# Patient Record
Sex: FEMALE | Race: WHITE | Employment: FULL TIME | URBAN - METROPOLITAN AREA
[De-identification: names, ages, dates, MRNs, and addresses within clinical notes are randomized per-mention and may not be internally consistent; named-entity substitution may affect disease eponyms.]

---

## 2018-05-24 ENCOUNTER — OFFICE VISIT (OUTPATIENT)
Dept: INTERNAL MEDICINE CLINIC | Age: 63
End: 2018-05-24

## 2018-05-24 VITALS
HEIGHT: 69 IN | HEART RATE: 77 BPM | OXYGEN SATURATION: 97 % | DIASTOLIC BLOOD PRESSURE: 80 MMHG | SYSTOLIC BLOOD PRESSURE: 115 MMHG | WEIGHT: 210 LBS | TEMPERATURE: 98.2 F | RESPIRATION RATE: 18 BRPM | BODY MASS INDEX: 31.1 KG/M2

## 2018-05-24 DIAGNOSIS — Z12.11 SCREENING FOR COLON CANCER: ICD-10-CM

## 2018-05-24 DIAGNOSIS — Z00.00 ROUTINE PHYSICAL EXAMINATION: Primary | ICD-10-CM

## 2018-05-24 DIAGNOSIS — R31.21 ASYMPTOMATIC MICROSCOPIC HEMATURIA: ICD-10-CM

## 2018-05-24 LAB
BILIRUB UR QL STRIP: NEGATIVE
GLUCOSE UR-MCNC: NEGATIVE MG/DL
KETONES P FAST UR STRIP-MCNC: NEGATIVE MG/DL
PH UR STRIP: 5.5 [PH] (ref 4.6–8)
PROT UR QL STRIP: NEGATIVE
SP GR UR STRIP: 1.02 (ref 1–1.03)
UA UROBILINOGEN AMB POC: NORMAL (ref 0.2–1)
URINALYSIS CLARITY POC: CLEAR
URINALYSIS COLOR POC: NORMAL
URINE BLOOD POC: NORMAL
URINE LEUKOCYTES POC: NORMAL
URINE NITRITES POC: NEGATIVE

## 2018-05-24 NOTE — MR AVS SNAPSHOT
[address] [facility] [address] [address] 
[phone] Patient: Alexandria Ratliff MRN: [id number] QCQ:[id number] Visit Information Date & Time Provider Department Dept. Phone Encounter #  
 5/24/2018 11:15 AM Yvan Ho PA-C [facility] [phone] [id number] Upcoming Health Maintenance Date Due DTaP/Tdap/Td series (1 - Tdap) 11/19/1976 PAP AKA CERVICAL CYTOLOGY 11/19/1976 FOBT Q 1 YEAR AGE 50-75 11/19/2005 ZOSTER VACCINE AGE 60> 9/19/2015 Influenza Age 5 to Adult 8/1/2018 BREAST CANCER SCRN MAMMOGRAM 1/5/2020 Allergies as of 5/24/2018  Review Complete On: 5/24/2018 By: Yvan Ho PA-C No Known Allergies Current Immunizations  Never Reviewed No immunizations on file. Not reviewed this visit You Were Diagnosed With   
  
 Codes Comments Routine physical examination    -  Primary ICD-10-CM: Z00.00 ICD-9-CM: V70.0 Asymptomatic microscopic hematuria     ICD-10-CM: R31.21 
ICD-9-CM: 599.72 Screening for colon cancer     ICD-10-CM: Z12.11 ICD-9-CM: V76.51 Vitals BP Pulse Temp Resp Height(growth percentile) Weight(growth percentile) 115/80 77 98.2 °F (36.8 °C) (Oral) 18 5' 9\" (1.753 m) 210 lb (95.3 kg) SpO2 BMI OB Status Smoking Status 97% 31.01 kg/m2 Menopause Never Smoker BMI and BSA Data Body Mass Index Body Surface Area 31.01 kg/m 2 2.15 m 2 Your Updated Medication List  
  
Notice  As of 5/24/2018 11:44 AM  
 You have not been prescribed any medications. We Performed the Following COLLECTION VENOUS BLOOD,VENIPUNCTURE [id number] CPT(R)] Introducing [facility]! [facility] introduces [employer] patient portal. Now you can access parts of your medical record, email your doctor's office, and request medication refills online.    
 
1. In your internet browser, go to [email or web address] 2. Click on the First Time User? Click Here link in the Sign In box. You will see the New Member Sign Up page. 3. Enter your SNAPCARD Access Code exactly as it appears below. You will not need to use this code after youve completed the sign-up process. If you do not sign up before the expiration date, you must request a new code. · SNAPCARD Access Code: [email or web address] Expires: 8/22/2018 11:44 AM 
 
4. Enter the last four digits of your Social Security Number (xxxx) and Date of Birth (mm/dd/yyyy) as indicated and click Submit. You will be taken to the next sign-up page. 5. Create a treadalongt ID. This will be your SNAPCARD login ID and cannot be changed, so think of one that is secure and easy to remember. 6. Create a SNAPCARD password. You can change your password at any time. 7. Enter your Password Reset Question and Answer. This can be used at a later time if you forget your password. 8. Enter your e-mail address. You will receive e-mail notification when new information is available in [address] 9. Click Sign Up. You can now view and download portions of your medical record. 10. Click the Download Summary menu link to download a portable copy of your medical information. If you have questions, please visit the Frequently Asked Questions section of the SNAPCARD website. Remember, SNAPCARD is NOT to be used for urgent needs. For medical emergencies, dial 911. Now available from your iPhone and Android! Please provide this summary of care documentation to your next provider. If you have any questions after today's visit, please call [phone].

## 2018-05-24 NOTE — PROGRESS NOTES
HISTORY OF PRESENT ILLNESS  Dianne Dewey is a 58 y.o. female. HPI Ms. Bonita Vaughn is here to establish care and for a routine physical exam.  She is going to have a trigger finger release in the near future. She has had left thumb pain and locking for several weeks now. Ortho administered a steroid injection which did not provide any relief. She is up to date on her mammogram, but is due for a PAP. She has not had a colonoscopy and she is aware she is over due. She is interested in at least having the fecal occult blood test.   She denies chest pain, shortness of breath or leg swelling. Review of Systems   Constitutional: Negative. HENT: Negative. Eyes: Negative. Respiratory: Negative. Cardiovascular: Negative. Gastrointestinal: Negative. Genitourinary: Negative. Musculoskeletal: Positive for joint pain (left thumb - trigger thumb). Skin: Negative. Neurological: Negative. Psychiatric/Behavioral: Negative. Physical Exam   Constitutional: She is oriented to person, place, and time. She appears well-developed and well-nourished. No distress. HENT:   Head: Normocephalic and atraumatic. Eyes: Conjunctivae are normal.   Neck: Normal range of motion. Neck supple. Cardiovascular: Normal rate and regular rhythm. No murmur heard. Pulmonary/Chest: Effort normal and breath sounds normal. She has no wheezes. Abdominal: Soft. Bowel sounds are normal.   Musculoskeletal: She exhibits no edema. Left hand: She exhibits decreased range of motion and tenderness. Lymphadenopathy:     She has no cervical adenopathy. Neurological: She is alert and oriented to person, place, and time. Skin: No rash noted. Psychiatric: She has a normal mood and affect.  Her behavior is normal. Judgment and thought content normal.     Visit Vitals    /80    Pulse 77    Temp 98.2 °F (36.8 °C) (Oral)    Resp 18    Ht 5' 9\" (1.753 m)    Wt 210 lb (95.3 kg)    SpO2 97%    BMI 31.01 kg/m2     Results for orders placed or performed in visit on 05/24/18   AMB POC URINALYSIS DIP STICK AUTO W/O MICRO   Result Value Ref Range    Color (UA POC) Bhavani     Clarity (UA POC) Clear     Glucose (UA POC) Negative Negative    Bilirubin (UA POC) Negative Negative    Ketones (UA POC) Negative Negative    Specific gravity (UA POC) 1.020 1.001 - 1.035    Blood (UA POC) Trace Negative    pH (UA POC) 5.5 4.6 - 8.0    Protein (UA POC) Negative Negative    Urobilinogen (UA POC) 0.2 mg/dL 0.2 - 1    Nitrites (UA POC) Negative Negative    Leukocyte esterase (UA POC) 1+ Negative       ASSESSMENT and PLAN    ICD-10-CM ICD-9-CM    1. Routine physical examination Z00.00 V70.0 COLLECTION VENOUS BLOOD,VENIPUNCTURE      HEPATITIS C AB      LIPID PANEL      METABOLIC PANEL, COMPREHENSIVE      TSH 3RD GENERATION      CBC WITH AUTOMATED DIFF      AMB POC URINALYSIS DIP STICK AUTO W/O MICRO   2. Asymptomatic microscopic hematuria R31.21 599.72 URINALYSIS W/MICROSCOPIC   3. Screening for colon cancer Z12.11 V76.51 OCCULT BLOOD, IMMUNOASSAY (FIT)     Encouraged shingles vaccine - Shigrix and annual flu vaccine. She'll return for a PAP smear. Will call or send letter with lab results and recommendations.

## 2018-05-24 NOTE — PROGRESS NOTES
Chief Complaint   Patient presents with    Complete Physical   1. Have you been to the ER, urgent care clinic since your last visit? Hospitalized since your last visit? No    2. Have you seen or consulted any other health care providers outside of the [address] [place] [address] since your last visit? Include any pap smears or colon screening.  No

## 2018-05-25 LAB
ABSOLUTE BANDS, 67058: NORMAL
ABSOLUTE BLASTS: NORMAL
ABSOLUTE METAMYELOCYTES, 900360: NORMAL
ABSOLUTE MYELOCYTES: NORMAL
ABSOLUTE NRBC,ANRBC: NORMAL
ABSOLUTE PROMYELOCYTES: NORMAL
ALB/GLOBRATIO, 58C: 1.8 (CALC) (ref 1–2.5)
ALBUMIN SERPL-MCNC: 4.4 G/DL (ref 3.6–5.1)
ALP SERPL-CCNC: 89 U/L (ref 33–130)
ALT SERPL-CCNC: 26 U/L (ref 6–29)
AMORPHOUS SEDIMENT: ABNORMAL
APPEARANCE UR: CLEAR
AST SERPL W P-5'-P-CCNC: 24 U/L (ref 10–35)
BACTERIA,BACTU: ABNORMAL /HPF
BANDS,BANDS: NORMAL
BASOPHILS # BLD: 77 CELLS/UL (ref 0–200)
BASOPHILS NFR BLD: 1.4 %
BILIRUB SERPL-MCNC: 0.6 MG/DL (ref 0.2–1.2)
BILIRUB UR QL: NEGATIVE
BILIRUB UR QL: NEGATIVE
BLASTS,BLAST: NORMAL
BUN SERPL-MCNC: 13 MG/DL (ref 7–25)
BUN/CREATININE RATIO,BUCR: NORMAL (CALC) (ref 6–22)
CA OXALATE CRYSTALS,CAOXC: ABNORMAL
CALCIUM SERPL-MCNC: 9.5 MG/DL (ref 8.6–10.4)
CASTS,URINE,CSTS: ABNORMAL
CHLORIDE SERPL-SCNC: 104 MMOL/L (ref 98–110)
CHOL/HDL RATIO,CHHDX: 3.9 (CALC)
CHOLEST SERPL-MCNC: 229 MG/DL
CO2 SERPL-SCNC: 26 MMOL/L (ref 20–31)
COLOR UR: YELLOW
COMMENT(S): NORMAL
COMMENT, 25003510: ABNORMAL
COMMENT, 35578784: ABNORMAL
CREAT SERPL-MCNC: 0.78 MG/DL (ref 0.5–0.99)
CRYSTALS,UCRY: ABNORMAL
EOSINOPHIL # BLD: 110 CELLS/UL (ref 15–500)
EOSINOPHIL NFR BLD: 2 %
ERYTHROCYTE [DISTWIDTH] IN BLOOD BY AUTOMATED COUNT: 12.9 % (ref 11–15)
GLOBULIN,GLOB: 2.5 G/DL (CALC) (ref 1.9–3.7)
GLUCOSE SERPL-MCNC: 84 MG/DL (ref 65–99)
GRANULAR CAST,GRCST: ABNORMAL
HCT VFR BLD AUTO: 43.8 % (ref 35–45)
HDLC SERPL-MCNC: 58 MG/DL
HEPATITIS C AB,HCAB: NORMAL
HGB BLD-MCNC: 14.7 G/DL (ref 11.7–15.5)
HGB UR QL STRIP: NEGATIVE
HYALINE CAST,HYCST: ABNORMAL /LPF
KETONES UR QL STRIP.AUTO: NEGATIVE
LDL-CHOLESTEROL: 145 MG/DL (CALC)
LEUKOCYTE ESTERASE: ABNORMAL
LYMPHOCYTES # BLD: 1843 CELLS/UL (ref 850–3900)
LYMPHOCYTES NFR BLD: 33.5 %
MCH RBC QN AUTO: 31.3 PG (ref 27–33)
MCHC RBC AUTO-ENTMCNC: 33.6 G/DL (ref 32–36)
MCV RBC AUTO: 93.4 FL (ref 80–100)
METAMYELOCYTES,METAS: NORMAL
MONOCYTES # BLD: 358 CELLS/UL (ref 200–950)
MONOCYTES NFR BLD: 6.5 %
MYELOCYTES,MYELO: NORMAL
NEUTROPHILS # BLD AUTO: 3113 CELLS/UL (ref 1500–7800)
NEUTROPHILS # BLD: 56.6 %
NITRITE UR QL STRIP.AUTO: NEGATIVE
NON-HDL CHOLESTEROL, 011976: 171 MG/DL (CALC)
NOTE, 30011300: ABNORMAL
NRBC: NORMAL
PH UR STRIP: 5.5 [PH] (ref 5–8)
PLATELET # BLD AUTO: 260 THOUSAND/UL (ref 140–400)
PMV BLD AUTO: 11.1 FL (ref 7.5–12.5)
POTASSIUM SERPL-SCNC: 4.5 MMOL/L (ref 3.5–5.3)
PROMYELOCYTES,PRO: NORMAL
PROT SERPL-MCNC: 6.9 G/DL (ref 6.1–8.1)
PROT UR STRIP-MCNC: NEGATIVE MG/DL
RBC # BLD AUTO: 4.69 MILLION/UL (ref 3.8–5.1)
RBC #/AREA URNS HPF: ABNORMAL /HPF (ref 0–2)
REACTIVE LYMPHS: NORMAL
REDUCING SUBSTANCES: ABNORMAL
RENAL EPITHELIAL CELLS, 6131: ABNORMAL
SIGNAL TO CUTOFF (HEP C), 619802: 0.01
SODIUM SERPL-SCNC: 140 MMOL/L (ref 135–146)
SP GR UR REFRACTOMETRY: 1.02 (ref 1–1.03)
SQUAMOUS EPITHELIAL CELLS: ABNORMAL /HPF
TRANSITIONAL EPITHELIAL CELLS, 6132: ABNORMAL
TRIGL SERPL-MCNC: 140 MG/DL (ref ?–150)
TRIPLE PHOS. CRYSTAL,TRIPC: ABNORMAL
TSH SERPL DL<=0.005 MIU/L-ACNC: 1.76 MIU/L (ref 0.4–4.5)
URATE CRY URNS QL MICRO: ABNORMAL
WBC # BLD AUTO: 5.5 THOUSAND/UL (ref 3.8–10.8)
WBC URNS QL MICRO: ABNORMAL /HPF (ref 0–5)
YEAST URINE, 5174: ABNORMAL

## 2019-05-22 ENCOUNTER — HOSPITAL ENCOUNTER (OUTPATIENT)
Dept: LAB | Age: 64
Discharge: HOME | End: 2019-05-22
Payer: COMMERCIAL

## 2019-05-22 DIAGNOSIS — R31.21 ASYMPTOMATIC MICROSCOPIC HEMATURIA: ICD-10-CM

## 2019-05-22 DIAGNOSIS — Z00.00 ROUTINE PHYSICAL EXAMINATION: ICD-10-CM

## 2019-05-22 LAB
APPEARANCE UR: CLEAR
BACTERIA URNS QL MICRO: ABNORMAL /HPF
BILIRUB UR QL: NEGATIVE
COLOR UR: YELLOW
EPITH CASTS URNS QL MICRO: ABNORMAL /LPF (ref 0–5)
GLUCOSE UR STRIP.AUTO-MCNC: NEGATIVE MG/DL
HGB UR QL STRIP: NEGATIVE
KETONES UR QL STRIP.AUTO: NEGATIVE MG/DL
LEUKOCYTE ESTERASE UR QL STRIP.AUTO: ABNORMAL
NITRITE UR QL STRIP.AUTO: NEGATIVE
PH UR STRIP: 7 [PH] (ref 5–8)
PROT UR STRIP-MCNC: NEGATIVE MG/DL
RBC #/AREA URNS HPF: 0 /HPF (ref 0–5)
SP GR UR REFRACTOMETRY: 1.02 (ref 1–1.03)
UROBILINOGEN UR QL STRIP.AUTO: 0.2 EU/DL (ref 0.2–1)
WBC URNS QL MICRO: ABNORMAL /HPF (ref 0–4)

## 2019-05-22 PROCEDURE — 86803 HEPATITIS C AB TEST: CPT

## 2019-05-22 PROCEDURE — 81001 URINALYSIS AUTO W/SCOPE: CPT

## 2019-05-22 PROCEDURE — 36415 COLL VENOUS BLD VENIPUNCTURE: CPT

## 2019-05-23 LAB
HCV AB SER IA-ACNC: <0.02 INDEX
HCV AB SERPL QL IA: NEGATIVE
HCV COMMENT,HCGAC: NORMAL

## 2019-05-24 ENCOUNTER — OFFICE VISIT (OUTPATIENT)
Dept: INTERNAL MEDICINE CLINIC | Age: 64
End: 2019-05-24

## 2019-05-24 ENCOUNTER — DOCUMENTATION ONLY (OUTPATIENT)
Dept: INTERNAL MEDICINE CLINIC | Age: 64
End: 2019-05-24

## 2019-05-24 VITALS
DIASTOLIC BLOOD PRESSURE: 76 MMHG | HEIGHT: 69 IN | WEIGHT: 210 LBS | BODY MASS INDEX: 31.1 KG/M2 | SYSTOLIC BLOOD PRESSURE: 123 MMHG | RESPIRATION RATE: 18 BRPM | HEART RATE: 66 BPM | TEMPERATURE: 98.6 F | OXYGEN SATURATION: 99 %

## 2019-05-24 DIAGNOSIS — R82.71 BACTERIA IN URINE: ICD-10-CM

## 2019-05-24 DIAGNOSIS — E78.00 HYPERCHOLESTEREMIA: ICD-10-CM

## 2019-05-24 DIAGNOSIS — Z01.419 GYNECOLOGIC EXAM NORMAL: ICD-10-CM

## 2019-05-24 DIAGNOSIS — Z00.00 ROUTINE GENERAL MEDICAL EXAMINATION AT A HEALTH CARE FACILITY: Primary | ICD-10-CM

## 2019-05-24 DIAGNOSIS — Z12.11 SCREENING FOR COLON CANCER: ICD-10-CM

## 2019-05-24 NOTE — PROGRESS NOTES
Chief Complaint   Patient presents with    Complete Physical     1. Have you been to the ER, urgent care clinic since your last visit? Hospitalized since your last visit? No    2. Have you seen or consulted any other health care providers outside of the [address] [place] [address] since your last visit? Include any pap smears or colon screening.  No

## 2019-05-24 NOTE — PROGRESS NOTES
HISTORY OF PRESENT ILLNESS Mark Lorenzo is a 61 y.o. female. HPI 
 
ROS Physical Exam 
 
ASSESSMENT and PLAN 
{ASSESSMENT/PLAN:[id number]}

## 2019-05-29 LAB
ABSOLUTE BANDS, 67058: NORMAL
ABSOLUTE BLASTS: NORMAL
ABSOLUTE METAMYELOCYTES, 900360: NORMAL
ABSOLUTE MYELOCYTES: NORMAL
ABSOLUTE NRBC,ANRBC: NORMAL
ABSOLUTE PROMYELOCYTES: NORMAL
ALB/GLOBRATIO, 58C: 1.6 (CALC) (ref 1–2.5)
ALBUMIN SERPL-MCNC: 4.5 G/DL (ref 3.6–5.1)
ALP SERPL-CCNC: 82 U/L (ref 33–130)
ALT SERPL-CCNC: 22 U/L (ref 6–29)
AST SERPL W P-5'-P-CCNC: 20 U/L (ref 10–35)
BANDS,BANDS: NORMAL
BASOPHILS # BLD: 70 CELLS/UL (ref 0–200)
BASOPHILS NFR BLD: 1.3 %
BILIRUB SERPL-MCNC: 0.3 MG/DL (ref 0.2–1.2)
BLASTS,BLAST: NORMAL
BUN SERPL-MCNC: 16 MG/DL (ref 7–25)
BUN/CREATININE RATIO,BUCR: NORMAL (CALC) (ref 6–22)
CALCIUM SERPL-MCNC: 9.6 MG/DL (ref 8.6–10.4)
CHLORIDE SERPL-SCNC: 104 MMOL/L (ref 98–110)
CHOL/HDL RATIO,CHHDX: 3.5 (CALC)
CHOLEST SERPL-MCNC: 226 MG/DL
CLINICAL INFORMATION: NORMAL
CO2 SERPL-SCNC: 24 MMOL/L (ref 20–32)
COMMENT(S): NORMAL
COMMENT, 75001200: NORMAL
COMMENT, 90001280: NORMAL
CREAT SERPL-MCNC: 0.71 MG/DL (ref 0.5–0.99)
CYTOTECHNOLOGIST: NORMAL
EAG (MG/DL),9916804: 97 (CALC)
EAG (MMOL/L),9916805: 5.4 (CALC)
EOSINOPHIL # BLD: 81 CELLS/UL (ref 15–500)
EOSINOPHIL NFR BLD: 1.5 %
ERYTHROCYTE [DISTWIDTH] IN BLOOD BY AUTOMATED COUNT: 12.7 % (ref 11–15)
GENERAL CATEGORIZATION: NORMAL
GLOBULIN,GLOB: 2.8 G/DL (CALC) (ref 1.9–3.7)
GLUCOSE SERPL-MCNC: 93 MG/DL (ref 65–99)
HBA1C MFR BLD HPLC: 5 % OF TOTAL HGB
HCT VFR BLD AUTO: 44.7 % (ref 35–45)
HDLC SERPL-MCNC: 64 MG/DL
HGB BLD-MCNC: 14.8 G/DL (ref 11.7–15.5)
HPV MRNA E6/E7: NOT DETECTED
INFECTION: NORMAL
INTERPRETATION/RESULT: NORMAL
ISOLATE 1: NORMAL
ISOLATE 2: NORMAL
ISOLATE 3: NORMAL
ISOLATE 4: NORMAL
ISOLATE 5: NORMAL
LDL-CHOLESTEROL: 138 MG/DL (CALC)
LMP: NORMAL
LYMPHOCYTES # BLD: 1679 CELLS/UL (ref 850–3900)
LYMPHOCYTES NFR BLD: 31.1 %
MCH RBC QN AUTO: 31.2 PG (ref 27–33)
MCHC RBC AUTO-ENTMCNC: 33.1 G/DL (ref 32–36)
MCV RBC AUTO: 94.3 FL (ref 80–100)
METAMYELOCYTES,METAS: NORMAL
MONOCYTES # BLD: 313 CELLS/UL (ref 200–950)
MONOCYTES NFR BLD: 5.8 %
MYELOCYTES,MYELO: NORMAL
NEUTROPHILS # BLD AUTO: 3256 CELLS/UL (ref 1500–7800)
NEUTROPHILS # BLD: 60.3 %
NON-HDL CHOLESTEROL, 011976: 162 MG/DL (CALC)
NRBC: NORMAL
PATHOLOGIST: NORMAL
PLATELET # BLD AUTO: 285 THOUSAND/UL (ref 140–400)
PMV BLD AUTO: 12.4 FL (ref 7.5–12.5)
POTASSIUM SERPL-SCNC: 4.4 MMOL/L (ref 3.5–5.3)
PREV.BX: NORMAL
PREV.PAP: NORMAL
PROMYELOCYTES,PRO: NORMAL
PROT SERPL-MCNC: 7.3 G/DL (ref 6.1–8.1)
RBC # BLD AUTO: 4.74 MILLION/UL (ref 3.8–5.1)
REACTIVE LYMPHS: NORMAL
REPORT STATUS: NORMAL
RESULT, 75002000: NO GROWTH
REVIEW CYTOTECHNOLOGIST: NORMAL
SODIUM SERPL-SCNC: 139 MMOL/L (ref 135–146)
SOURCE, 75000000: NORMAL
SOURCE: NORMAL
STATEMENT OF ADEQUACY: NORMAL
STATUS: NORMAL
TRIGL SERPL-MCNC: 121 MG/DL (ref ?–150)
TSH SERPL DL<=0.005 MIU/L-ACNC: 1.9 MIU/L (ref 0.4–4.5)
WBC # BLD AUTO: 5.4 THOUSAND/UL (ref 3.8–10.8)

## 2019-05-29 RX ORDER — ROSUVASTATIN CALCIUM 10 MG/1
10 TABLET, COATED ORAL
Qty: 90 TAB | Refills: 1 | Status: SHIPPED | OUTPATIENT
Start: 2019-05-29 | End: 2019-12-14 | Stop reason: SDUPTHER

## 2019-05-29 NOTE — PROGRESS NOTES
SUBJECTIVE:   Marely 44 y.o. female for annual routine Pap and checkup. She had labs drawn earlier in the week. However, I realized labs for her physical were not drawn. Labs for an old lab req that had already been resulted were done. Will have to redraw labs. She occasionally has some vaginal irritation/irritation with urination. She would like to have the CT screen for calcium score ordered also. She does have h/o high cholesterol. Will repeat today. Allergies: Patient has no known allergies. No LMP recorded. (Menstrual status: Menopause). GYN ROS:  Feeling well. No dyspnea or chest pain on exertion. No abdominal pain, change in bowel habits, black or bloody stools. No urinary tract symptoms. ROS: no breast pain or new or enlarging lumps on self exam, no vaginal bleeding. No neurological complaints. OBJECTIVE:   The patient appears well, alert, oriented x 3, in no distress. Visit Vitals  /76 (BP 1 Location: Left arm, BP Patient Position: Sitting)   Pulse 66   Temp 98.6 °F (37 °C) (Oral)   Resp 18   Ht 5' 9\" (1.753 m)   Wt 210 lb (95.3 kg)   SpO2 99%   BMI 31.01 kg/m²     ENT normal.  Neck supple. No adenopathy or thyromegaly. NADEEM. Lungs are clear, good air entry, no wheezes, rhonchi or rales. No murmurs, regular rate and rhythm. Abdomen soft without tenderness, guarding, mass or organomegaly. Extremities show no edema, normal peripheral pulses. Neurological is normal, no focal findings. BREAST EXAM: breasts appear normal, no suspicious masses, no skin or nipple changes or axillary nodes    PELVIC EXAM: normal external genitalia, vulva, vagina, cervix, uterus and adnexa    Diagnoses and all orders for this visit:    1. Routine general medical examination at a health care facility  -     METABOLIC PANEL, COMPREHENSIVE; Future  -     LIPID PANEL; Future  -     CBC WITH AUTOMATED DIFF; Future  -     TSH 3RD GENERATION; Future  -     HEMOGLOBIN A1C WITH EAG; Future    2.  Screening for colon cancer  -     COLOGUARD TEST (FECAL DNA COLORECTAL CANCER SCREENING)    3. Bacteria in urine  -     CULTURE, URINE; Future    4. Gynecologic exam normal  -     PAP IG, APTIMA HPV AND RFX 16/18,45 ([id number]); Future    5. Hypercholesteremia  -     CT CHEST WO CONT; Future        Pt verbalized understanding of their condition and diagnoses, treatment plan,  as well as side effects of any new medications prescribed.

## 2019-05-30 NOTE — PATIENT INSTRUCTIONS

## 2019-06-17 ENCOUNTER — DOCUMENTATION ONLY (OUTPATIENT)
Dept: INTERNAL MEDICINE CLINIC | Age: 64
End: 2019-06-17

## 2019-07-09 ENCOUNTER — HOSPITAL ENCOUNTER (OUTPATIENT)
Dept: CT IMAGING | Age: 64
Discharge: HOME | End: 2019-07-09
Attending: INTERNAL MEDICINE
Payer: SELF-PAY

## 2019-07-09 DIAGNOSIS — Z13.6 SCREENING FOR ISCHEMIC HEART DISEASE: ICD-10-CM

## 2019-07-09 PROCEDURE — 75571 CT HRT W/O DYE W/CA TEST: CPT

## 2019-07-31 DIAGNOSIS — R93.89 ABNORMAL CHEST CT: Primary | ICD-10-CM

## 2019-12-17 RX ORDER — ROSUVASTATIN CALCIUM 10 MG/1
TABLET, COATED ORAL
Qty: 90 TAB | Refills: 0 | Status: SHIPPED | OUTPATIENT
Start: 2019-12-17

## 2019-12-17 NOTE — TELEPHONE ENCOUNTER
I refilled for 90 days, but she should have follow up labs for her cholesterol. Have her schedule after the new year thanks .